# Patient Record
Sex: MALE | Race: WHITE | NOT HISPANIC OR LATINO | Employment: FULL TIME | URBAN - METROPOLITAN AREA
[De-identification: names, ages, dates, MRNs, and addresses within clinical notes are randomized per-mention and may not be internally consistent; named-entity substitution may affect disease eponyms.]

---

## 2017-09-01 ENCOUNTER — OFFICE VISIT (OUTPATIENT)
Dept: URGENT CARE | Facility: MEDICAL CENTER | Age: 27
End: 2017-09-01
Payer: COMMERCIAL

## 2017-09-01 PROCEDURE — 93005 ELECTROCARDIOGRAM TRACING: CPT

## 2017-09-02 ENCOUNTER — GENERIC CONVERSION - ENCOUNTER (OUTPATIENT)
Dept: OTHER | Facility: OTHER | Age: 27
End: 2017-09-02

## 2017-09-02 LAB
ATRIAL RATE: 64 BPM
ATRIAL RATE: 65 BPM
P AXIS: 46 DEGREES
P AXIS: 59 DEGREES
PR INTERVAL: 138 MS
PR INTERVAL: 138 MS
QRS AXIS: 63 DEGREES
QRS AXIS: 65 DEGREES
QRSD INTERVAL: 88 MS
QRSD INTERVAL: 88 MS
QT INTERVAL: 396 MS
QT INTERVAL: 396 MS
QTC INTERVAL: 408 MS
QTC INTERVAL: 411 MS
T WAVE AXIS: 49 DEGREES
T WAVE AXIS: 51 DEGREES
VENTRICULAR RATE: 64 BPM
VENTRICULAR RATE: 65 BPM

## 2017-09-06 ENCOUNTER — TRANSCRIBE ORDERS (OUTPATIENT)
Dept: ADMINISTRATIVE | Facility: HOSPITAL | Age: 27
End: 2017-09-06

## 2017-09-06 ENCOUNTER — APPOINTMENT (OUTPATIENT)
Dept: LAB | Facility: MEDICAL CENTER | Age: 27
End: 2017-09-06
Payer: COMMERCIAL

## 2017-09-06 DIAGNOSIS — E11.9 DIABETES MELLITUS WITHOUT COMPLICATION (HCC): Primary | ICD-10-CM

## 2017-09-06 LAB
ALBUMIN SERPL BCP-MCNC: 3.7 G/DL (ref 3.5–5)
ALP SERPL-CCNC: 69 U/L (ref 46–116)
ALT SERPL W P-5'-P-CCNC: 43 U/L (ref 12–78)
ANION GAP SERPL CALCULATED.3IONS-SCNC: 5 MMOL/L (ref 4–13)
AST SERPL W P-5'-P-CCNC: 15 U/L (ref 5–45)
BASOPHILS # BLD AUTO: 0.08 THOUSANDS/ΜL (ref 0–0.1)
BASOPHILS NFR BLD AUTO: 1 % (ref 0–1)
BILIRUB SERPL-MCNC: 1.26 MG/DL (ref 0.2–1)
BUN SERPL-MCNC: 8 MG/DL (ref 5–25)
CALCIUM SERPL-MCNC: 9.4 MG/DL (ref 8.3–10.1)
CHLORIDE SERPL-SCNC: 100 MMOL/L (ref 100–108)
CHOLEST SERPL-MCNC: 184 MG/DL (ref 50–200)
CO2 SERPL-SCNC: 30 MMOL/L (ref 21–32)
CREAT SERPL-MCNC: 0.96 MG/DL (ref 0.6–1.3)
CREAT UR-MCNC: 109 MG/DL
EOSINOPHIL # BLD AUTO: 0.3 THOUSAND/ΜL (ref 0–0.61)
EOSINOPHIL NFR BLD AUTO: 4 % (ref 0–6)
ERYTHROCYTE [DISTWIDTH] IN BLOOD BY AUTOMATED COUNT: 12.3 % (ref 11.6–15.1)
ERYTHROCYTE [SEDIMENTATION RATE] IN BLOOD: 5 MM/HOUR (ref 0–10)
EST. AVERAGE GLUCOSE BLD GHB EST-MCNC: 183 MG/DL
GFR SERPL CREATININE-BSD FRML MDRD: 108 ML/MIN/1.73SQ M
GLUCOSE P FAST SERPL-MCNC: 283 MG/DL (ref 65–99)
HBA1C MFR BLD: 8 % (ref 4.2–6.3)
HCT VFR BLD AUTO: 47.5 % (ref 36.5–49.3)
HDLC SERPL-MCNC: 84 MG/DL (ref 40–60)
HGB BLD-MCNC: 16.5 G/DL (ref 12–17)
LDLC SERPL CALC-MCNC: 90 MG/DL (ref 0–100)
LYMPHOCYTES # BLD AUTO: 2.48 THOUSANDS/ΜL (ref 0.6–4.47)
LYMPHOCYTES NFR BLD AUTO: 31 % (ref 14–44)
MCH RBC QN AUTO: 34.1 PG (ref 26.8–34.3)
MCHC RBC AUTO-ENTMCNC: 34.7 G/DL (ref 31.4–37.4)
MCV RBC AUTO: 98 FL (ref 82–98)
MICROALBUMIN UR-MCNC: 7.8 MG/L (ref 0–20)
MICROALBUMIN/CREAT 24H UR: 7 MG/G CREATININE (ref 0–30)
MONOCYTES # BLD AUTO: 0.69 THOUSAND/ΜL (ref 0.17–1.22)
MONOCYTES NFR BLD AUTO: 9 % (ref 4–12)
NEUTROPHILS # BLD AUTO: 4.44 THOUSANDS/ΜL (ref 1.85–7.62)
NEUTS SEG NFR BLD AUTO: 55 % (ref 43–75)
NRBC BLD AUTO-RTO: 0 /100 WBCS
PLATELET # BLD AUTO: 290 THOUSANDS/UL (ref 149–390)
PMV BLD AUTO: 9.8 FL (ref 8.9–12.7)
POTASSIUM SERPL-SCNC: 4.8 MMOL/L (ref 3.5–5.3)
PROT SERPL-MCNC: 7.4 G/DL (ref 6.4–8.2)
RBC # BLD AUTO: 4.84 MILLION/UL (ref 3.88–5.62)
SODIUM SERPL-SCNC: 135 MMOL/L (ref 136–145)
TRIGL SERPL-MCNC: 51 MG/DL
WBC # BLD AUTO: 8.01 THOUSAND/UL (ref 4.31–10.16)

## 2017-09-06 PROCEDURE — 85025 COMPLETE CBC W/AUTO DIFF WBC: CPT | Performed by: INTERNAL MEDICINE

## 2017-09-06 PROCEDURE — 82043 UR ALBUMIN QUANTITATIVE: CPT | Performed by: INTERNAL MEDICINE

## 2017-09-06 PROCEDURE — 36415 COLL VENOUS BLD VENIPUNCTURE: CPT | Performed by: INTERNAL MEDICINE

## 2017-09-06 PROCEDURE — 83036 HEMOGLOBIN GLYCOSYLATED A1C: CPT | Performed by: INTERNAL MEDICINE

## 2017-09-06 PROCEDURE — 80053 COMPREHEN METABOLIC PANEL: CPT | Performed by: INTERNAL MEDICINE

## 2017-09-06 PROCEDURE — 82570 ASSAY OF URINE CREATININE: CPT | Performed by: INTERNAL MEDICINE

## 2017-09-06 PROCEDURE — 80061 LIPID PANEL: CPT | Performed by: INTERNAL MEDICINE

## 2017-09-06 PROCEDURE — 85652 RBC SED RATE AUTOMATED: CPT | Performed by: INTERNAL MEDICINE

## 2018-08-29 ENCOUNTER — TRANSCRIBE ORDERS (OUTPATIENT)
Dept: ADMINISTRATIVE | Facility: HOSPITAL | Age: 28
End: 2018-08-29

## 2018-08-29 ENCOUNTER — APPOINTMENT (OUTPATIENT)
Dept: LAB | Facility: MEDICAL CENTER | Age: 28
End: 2018-08-29
Payer: COMMERCIAL

## 2018-08-29 DIAGNOSIS — E11.9 DIABETES MELLITUS WITHOUT COMPLICATION (HCC): Primary | ICD-10-CM

## 2018-08-29 LAB
CREAT UR-MCNC: 149 MG/DL
EST. AVERAGE GLUCOSE BLD GHB EST-MCNC: 189 MG/DL
HBA1C MFR BLD: 8.2 % (ref 4.2–6.3)
MICROALBUMIN UR-MCNC: 11.2 MG/L (ref 0–20)
MICROALBUMIN/CREAT 24H UR: 8 MG/G CREATININE (ref 0–30)

## 2018-08-29 PROCEDURE — 82043 UR ALBUMIN QUANTITATIVE: CPT | Performed by: INTERNAL MEDICINE

## 2018-08-29 PROCEDURE — 82570 ASSAY OF URINE CREATININE: CPT | Performed by: INTERNAL MEDICINE

## 2018-08-29 PROCEDURE — 36415 COLL VENOUS BLD VENIPUNCTURE: CPT | Performed by: INTERNAL MEDICINE

## 2018-08-29 PROCEDURE — 83036 HEMOGLOBIN GLYCOSYLATED A1C: CPT | Performed by: INTERNAL MEDICINE

## 2019-03-28 ENCOUNTER — HOSPITAL ENCOUNTER (EMERGENCY)
Facility: HOSPITAL | Age: 29
Discharge: HOME | End: 2019-03-28
Attending: EMERGENCY MEDICINE
Payer: COMMERCIAL

## 2019-03-28 VITALS
BODY MASS INDEX: 25.02 KG/M2 | RESPIRATION RATE: 16 BRPM | SYSTOLIC BLOOD PRESSURE: 126 MMHG | TEMPERATURE: 98 F | OXYGEN SATURATION: 100 % | WEIGHT: 155 LBS | HEART RATE: 94 BPM | DIASTOLIC BLOOD PRESSURE: 82 MMHG

## 2019-03-28 DIAGNOSIS — E10.649 HYPOGLYCEMIA DUE TO TYPE 1 DIABETES MELLITUS (HCC): Primary | ICD-10-CM

## 2019-03-28 LAB
ANION GAP SERPL CALCULATED.3IONS-SCNC: 11 MMOL/L (ref 4–13)
BASOPHILS # BLD AUTO: 0.06 THOUSANDS/ΜL (ref 0–0.1)
BASOPHILS NFR BLD AUTO: 1 % (ref 0–1)
BUN SERPL-MCNC: 9 MG/DL (ref 5–25)
CALCIUM SERPL-MCNC: 9.4 MG/DL (ref 8.3–10.1)
CHLORIDE SERPL-SCNC: 102 MMOL/L (ref 100–108)
CO2 SERPL-SCNC: 29 MMOL/L (ref 21–32)
CREAT SERPL-MCNC: 0.79 MG/DL (ref 0.6–1.3)
EOSINOPHIL # BLD AUTO: 0.08 THOUSAND/ΜL (ref 0–0.61)
EOSINOPHIL NFR BLD AUTO: 1 % (ref 0–6)
ERYTHROCYTE [DISTWIDTH] IN BLOOD BY AUTOMATED COUNT: 13.2 % (ref 11.6–15.1)
GFR SERPL CREATININE-BSD FRML MDRD: 121 ML/MIN/1.73SQ M
GLUCOSE SERPL-MCNC: 135 MG/DL (ref 65–140)
GLUCOSE SERPL-MCNC: 193 MG/DL (ref 65–140)
GLUCOSE SERPL-MCNC: 30 MG/DL (ref 65–140)
GLUCOSE SERPL-MCNC: <20 MG/DL (ref 65–140)
HCT VFR BLD AUTO: 45.1 % (ref 36.5–49.3)
HGB BLD-MCNC: 15.3 G/DL (ref 12–17)
IMM GRANULOCYTES # BLD AUTO: 0.05 THOUSAND/UL (ref 0–0.2)
IMM GRANULOCYTES NFR BLD AUTO: 0 % (ref 0–2)
LYMPHOCYTES # BLD AUTO: 1.86 THOUSANDS/ΜL (ref 0.6–4.47)
LYMPHOCYTES NFR BLD AUTO: 17 % (ref 14–44)
MCH RBC QN AUTO: 32.7 PG (ref 26.8–34.3)
MCHC RBC AUTO-ENTMCNC: 33.9 G/DL (ref 31.4–37.4)
MCV RBC AUTO: 96 FL (ref 82–98)
MONOCYTES # BLD AUTO: 0.73 THOUSAND/ΜL (ref 0.17–1.22)
MONOCYTES NFR BLD AUTO: 7 % (ref 4–12)
NEUTROPHILS # BLD AUTO: 8.35 THOUSANDS/ΜL (ref 1.85–7.62)
NEUTS SEG NFR BLD AUTO: 74 % (ref 43–75)
NRBC BLD AUTO-RTO: 0 /100 WBCS
PLATELET # BLD AUTO: 293 THOUSANDS/UL (ref 149–390)
PMV BLD AUTO: 8.5 FL (ref 8.9–12.7)
POTASSIUM SERPL-SCNC: 3.5 MMOL/L (ref 3.5–5.3)
RBC # BLD AUTO: 4.68 MILLION/UL (ref 3.88–5.62)
SODIUM SERPL-SCNC: 142 MMOL/L (ref 136–145)
WBC # BLD AUTO: 11.13 THOUSAND/UL (ref 4.31–10.16)

## 2019-03-28 PROCEDURE — 85025 COMPLETE CBC W/AUTO DIFF WBC: CPT | Performed by: EMERGENCY MEDICINE

## 2019-03-28 PROCEDURE — 99285 EMERGENCY DEPT VISIT HI MDM: CPT

## 2019-03-28 PROCEDURE — 80048 BASIC METABOLIC PNL TOTAL CA: CPT | Performed by: EMERGENCY MEDICINE

## 2019-03-28 PROCEDURE — 36415 COLL VENOUS BLD VENIPUNCTURE: CPT | Performed by: EMERGENCY MEDICINE

## 2019-03-28 PROCEDURE — 82948 REAGENT STRIP/BLOOD GLUCOSE: CPT

## 2019-03-28 PROCEDURE — 96374 THER/PROPH/DIAG INJ IV PUSH: CPT

## 2019-03-28 RX ORDER — DEXTROSE MONOHYDRATE 25 G/50ML
INJECTION, SOLUTION INTRAVENOUS
Status: DISCONTINUED
Start: 2019-03-28 | End: 2019-03-28 | Stop reason: HOSPADM

## 2019-03-28 RX ORDER — DEXTROSE MONOHYDRATE 25 G/50ML
50 INJECTION, SOLUTION INTRAVENOUS ONCE
Status: COMPLETED | OUTPATIENT
Start: 2019-03-28 | End: 2019-03-28

## 2019-03-28 RX ADMIN — DEXTROSE MONOHYDRATE 50 ML: 500 INJECTION PARENTERAL at 10:15

## 2019-12-17 ENCOUNTER — APPOINTMENT (EMERGENCY)
Dept: RADIOLOGY | Facility: HOSPITAL | Age: 29
DRG: 639 | End: 2019-12-17
Payer: COMMERCIAL

## 2019-12-17 ENCOUNTER — HOSPITAL ENCOUNTER (INPATIENT)
Facility: HOSPITAL | Age: 29
LOS: 1 days | Discharge: HOME | DRG: 639 | End: 2019-12-18
Attending: EMERGENCY MEDICINE | Admitting: ANESTHESIOLOGY
Payer: COMMERCIAL

## 2019-12-17 DIAGNOSIS — R07.9 CHEST PAIN: ICD-10-CM

## 2019-12-17 DIAGNOSIS — E11.10 DKA (DIABETIC KETOACIDOSES): Primary | ICD-10-CM

## 2019-12-17 PROBLEM — E10.10 TYPE 1 DIABETES MELLITUS WITH KETOACIDOSIS WITHOUT COMA (HCC): Status: ACTIVE | Noted: 2019-12-17

## 2019-12-17 LAB
ALBUMIN SERPL BCP-MCNC: 4 G/DL (ref 3.5–5)
ALP SERPL-CCNC: 69 U/L (ref 46–116)
ALT SERPL W P-5'-P-CCNC: 24 U/L (ref 12–78)
AMPHETAMINES SERPL QL SCN: NEGATIVE
ANION GAP SERPL CALCULATED.3IONS-SCNC: 13 MMOL/L (ref 4–13)
ANION GAP SERPL CALCULATED.3IONS-SCNC: 17 MMOL/L (ref 4–13)
ANION GAP SERPL CALCULATED.3IONS-SCNC: 7 MMOL/L (ref 4–13)
AST SERPL W P-5'-P-CCNC: 23 U/L (ref 5–45)
BACTERIA UR QL AUTO: NORMAL /HPF
BARBITURATES UR QL: NEGATIVE
BASE EX.OXY STD BLDV CALC-SCNC: 88.4 % (ref 60–80)
BASE EXCESS BLDV CALC-SCNC: -11.1 MMOL/L
BASOPHILS # BLD AUTO: 0.13 THOUSANDS/ΜL (ref 0–0.1)
BASOPHILS NFR BLD AUTO: 1 % (ref 0–1)
BENZODIAZ UR QL: NEGATIVE
BETA-HYDROXYBUTYRATE: 5.3 MMOL/L
BILIRUB SERPL-MCNC: 1.3 MG/DL (ref 0.2–1)
BILIRUB UR QL STRIP: NEGATIVE
BUN SERPL-MCNC: 16 MG/DL (ref 5–25)
CALCIUM SERPL-MCNC: 7.8 MG/DL (ref 8.3–10.1)
CALCIUM SERPL-MCNC: 8 MG/DL (ref 8.3–10.1)
CALCIUM SERPL-MCNC: 9.1 MG/DL (ref 8.3–10.1)
CHLORIDE SERPL-SCNC: 102 MMOL/L (ref 100–108)
CHLORIDE SERPL-SCNC: 103 MMOL/L (ref 100–108)
CHLORIDE SERPL-SCNC: 97 MMOL/L (ref 100–108)
CLARITY UR: CLEAR
CO2 SERPL-SCNC: 17 MMOL/L (ref 21–32)
CO2 SERPL-SCNC: 18 MMOL/L (ref 21–32)
CO2 SERPL-SCNC: 22 MMOL/L (ref 21–32)
COCAINE UR QL: NEGATIVE
COLOR UR: YELLOW
CREAT SERPL-MCNC: 0.95 MG/DL (ref 0.6–1.3)
CREAT SERPL-MCNC: 1.03 MG/DL (ref 0.6–1.3)
CREAT SERPL-MCNC: 1.04 MG/DL (ref 0.6–1.3)
D DIMER PPP FEU-MCNC: 0.19 UG/ML FEU (ref 0.19–0.52)
EOSINOPHIL # BLD AUTO: 0.02 THOUSAND/ΜL (ref 0–0.61)
EOSINOPHIL NFR BLD AUTO: 0 % (ref 0–6)
ERYTHROCYTE [DISTWIDTH] IN BLOOD BY AUTOMATED COUNT: 12.4 % (ref 11.6–15.1)
GFR SERPL CREATININE-BSD FRML MDRD: 108 ML/MIN/1.73SQ M
GFR SERPL CREATININE-BSD FRML MDRD: 97 ML/MIN/1.73SQ M
GFR SERPL CREATININE-BSD FRML MDRD: 98 ML/MIN/1.73SQ M
GLUCOSE SERPL-MCNC: 167 MG/DL (ref 65–140)
GLUCOSE SERPL-MCNC: 171 MG/DL (ref 65–140)
GLUCOSE SERPL-MCNC: 200 MG/DL (ref 65–140)
GLUCOSE SERPL-MCNC: 207 MG/DL (ref 65–140)
GLUCOSE SERPL-MCNC: 289 MG/DL (ref 65–140)
GLUCOSE SERPL-MCNC: 318 MG/DL (ref 65–140)
GLUCOSE SERPL-MCNC: 319 MG/DL (ref 65–140)
GLUCOSE SERPL-MCNC: 385 MG/DL (ref 65–140)
GLUCOSE UR STRIP-MCNC: ABNORMAL MG/DL
HCO3 BLDV-SCNC: 14.3 MMOL/L (ref 24–30)
HCT VFR BLD AUTO: 48 % (ref 36.5–49.3)
HGB BLD-MCNC: 15.6 G/DL (ref 12–17)
HGB UR QL STRIP.AUTO: NEGATIVE
IMM GRANULOCYTES # BLD AUTO: 0.12 THOUSAND/UL (ref 0–0.2)
IMM GRANULOCYTES NFR BLD AUTO: 1 % (ref 0–2)
KETONES UR STRIP-MCNC: ABNORMAL MG/DL
LACTATE SERPL-SCNC: 1.7 MMOL/L (ref 0.5–2)
LACTATE SERPL-SCNC: 2.7 MMOL/L (ref 0.5–2)
LEUKOCYTE ESTERASE UR QL STRIP: ABNORMAL
LYMPHOCYTES # BLD AUTO: 1.1 THOUSANDS/ΜL (ref 0.6–4.47)
LYMPHOCYTES NFR BLD AUTO: 6 % (ref 14–44)
MAGNESIUM SERPL-MCNC: 1.7 MG/DL (ref 1.6–2.6)
MCH RBC QN AUTO: 33.1 PG (ref 26.8–34.3)
MCHC RBC AUTO-ENTMCNC: 32.5 G/DL (ref 31.4–37.4)
MCV RBC AUTO: 102 FL (ref 82–98)
METHADONE UR QL: NEGATIVE
MONOCYTES # BLD AUTO: 0.93 THOUSAND/ΜL (ref 0.17–1.22)
MONOCYTES NFR BLD AUTO: 5 % (ref 4–12)
NEUTROPHILS # BLD AUTO: 16.87 THOUSANDS/ΜL (ref 1.85–7.62)
NEUTS SEG NFR BLD AUTO: 87 % (ref 43–75)
NITRITE UR QL STRIP: NEGATIVE
NON-SQ EPI CELLS URNS QL MICRO: NORMAL /HPF
NRBC BLD AUTO-RTO: 0 /100 WBCS
O2 CT BLDV-SCNC: 19.6 ML/DL
OPIATES UR QL SCN: NEGATIVE
PCO2 BLDV: 31.3 MM HG (ref 42–50)
PCP UR QL: NEGATIVE
PH BLDV: 7.28 [PH] (ref 7.3–7.4)
PH UR STRIP.AUTO: 5.5 [PH]
PHOSPHATE SERPL-MCNC: 2.7 MG/DL (ref 2.7–4.5)
PLATELET # BLD AUTO: 329 THOUSANDS/UL (ref 149–390)
PMV BLD AUTO: 9.1 FL (ref 8.9–12.7)
PO2 BLDV: 63.7 MM HG (ref 35–45)
POTASSIUM SERPL-SCNC: 3.9 MMOL/L (ref 3.5–5.3)
POTASSIUM SERPL-SCNC: 4.5 MMOL/L (ref 3.5–5.3)
POTASSIUM SERPL-SCNC: 5.1 MMOL/L (ref 3.5–5.3)
PROT SERPL-MCNC: 7.1 G/DL (ref 6.4–8.2)
PROT UR STRIP-MCNC: NEGATIVE MG/DL
RBC # BLD AUTO: 4.71 MILLION/UL (ref 3.88–5.62)
RBC #/AREA URNS AUTO: NORMAL /HPF
SODIUM SERPL-SCNC: 131 MMOL/L (ref 136–145)
SODIUM SERPL-SCNC: 132 MMOL/L (ref 136–145)
SODIUM SERPL-SCNC: 133 MMOL/L (ref 136–145)
SP GR UR STRIP.AUTO: 1.02 (ref 1–1.03)
THC UR QL: NEGATIVE
TROPONIN I SERPL-MCNC: <0.02 NG/ML
UROBILINOGEN UR QL STRIP.AUTO: 0.2 E.U./DL
WBC # BLD AUTO: 19.17 THOUSAND/UL (ref 4.31–10.16)
WBC #/AREA URNS AUTO: NORMAL /HPF

## 2019-12-17 PROCEDURE — 83735 ASSAY OF MAGNESIUM: CPT | Performed by: EMERGENCY MEDICINE

## 2019-12-17 PROCEDURE — 85025 COMPLETE CBC W/AUTO DIFF WBC: CPT | Performed by: EMERGENCY MEDICINE

## 2019-12-17 PROCEDURE — 93005 ELECTROCARDIOGRAM TRACING: CPT

## 2019-12-17 PROCEDURE — 85379 FIBRIN DEGRADATION QUANT: CPT | Performed by: EMERGENCY MEDICINE

## 2019-12-17 PROCEDURE — 81001 URINALYSIS AUTO W/SCOPE: CPT | Performed by: EMERGENCY MEDICINE

## 2019-12-17 PROCEDURE — 80048 BASIC METABOLIC PNL TOTAL CA: CPT | Performed by: NURSE PRACTITIONER

## 2019-12-17 PROCEDURE — 36415 COLL VENOUS BLD VENIPUNCTURE: CPT | Performed by: EMERGENCY MEDICINE

## 2019-12-17 PROCEDURE — 80307 DRUG TEST PRSMV CHEM ANLYZR: CPT | Performed by: EMERGENCY MEDICINE

## 2019-12-17 PROCEDURE — 82948 REAGENT STRIP/BLOOD GLUCOSE: CPT

## 2019-12-17 PROCEDURE — 87081 CULTURE SCREEN ONLY: CPT | Performed by: NURSE PRACTITIONER

## 2019-12-17 PROCEDURE — 71046 X-RAY EXAM CHEST 2 VIEWS: CPT

## 2019-12-17 PROCEDURE — 82010 KETONE BODYS QUAN: CPT | Performed by: EMERGENCY MEDICINE

## 2019-12-17 PROCEDURE — 83605 ASSAY OF LACTIC ACID: CPT | Performed by: EMERGENCY MEDICINE

## 2019-12-17 PROCEDURE — 84100 ASSAY OF PHOSPHORUS: CPT | Performed by: EMERGENCY MEDICINE

## 2019-12-17 PROCEDURE — 99285 EMERGENCY DEPT VISIT HI MDM: CPT

## 2019-12-17 PROCEDURE — 80053 COMPREHEN METABOLIC PANEL: CPT | Performed by: EMERGENCY MEDICINE

## 2019-12-17 PROCEDURE — 82805 BLOOD GASES W/O2 SATURATION: CPT | Performed by: EMERGENCY MEDICINE

## 2019-12-17 PROCEDURE — 84484 ASSAY OF TROPONIN QUANT: CPT | Performed by: EMERGENCY MEDICINE

## 2019-12-17 RX ORDER — ASPIRIN 325 MG
325 TABLET ORAL ONCE
Status: DISCONTINUED | OUTPATIENT
Start: 2019-12-17 | End: 2019-12-17

## 2019-12-17 RX ORDER — ACETAMINOPHEN 325 MG/1
650 TABLET ORAL ONCE
Status: COMPLETED | OUTPATIENT
Start: 2019-12-17 | End: 2019-12-17

## 2019-12-17 RX ORDER — SODIUM CHLORIDE, SODIUM LACTATE, POTASSIUM CHLORIDE, CALCIUM CHLORIDE 600; 310; 30; 20 MG/100ML; MG/100ML; MG/100ML; MG/100ML
125 INJECTION, SOLUTION INTRAVENOUS CONTINUOUS
Status: DISCONTINUED | OUTPATIENT
Start: 2019-12-17 | End: 2019-12-18

## 2019-12-17 RX ADMIN — SODIUM CHLORIDE 1000 ML: 0.9 INJECTION, SOLUTION INTRAVENOUS at 14:29

## 2019-12-17 RX ADMIN — ACETAMINOPHEN 650 MG: 325 TABLET, FILM COATED ORAL at 14:56

## 2019-12-17 RX ADMIN — SODIUM CHLORIDE 1000 ML: 0.9 INJECTION, SOLUTION INTRAVENOUS at 14:55

## 2019-12-17 RX ADMIN — SODIUM CHLORIDE, SODIUM LACTATE, POTASSIUM CHLORIDE, AND CALCIUM CHLORIDE 125 ML/HR: .6; .31; .03; .02 INJECTION, SOLUTION INTRAVENOUS at 17:17

## 2019-12-17 RX ADMIN — SODIUM CHLORIDE 6 UNITS/HR: 9 INJECTION, SOLUTION INTRAVENOUS at 15:19

## 2019-12-17 RX ADMIN — INSULIN HUMAN 10 UNITS: 100 INJECTION, SOLUTION PARENTERAL at 14:58

## 2019-12-17 NOTE — H&P
H&P- Veena Wong 1990, 34 y o  male MRN: [id number]    Unit/Bed#: ED 01 Encounter: [id number]    Primary Care Provider: Judah Reid MD   Date and time admitted to hospital: 12/17/2019  1:25 PM        * Type 1 diabetes mellitus with ketoacidosis without coma [facility])  Assessment & Plan  Lab Results   Component Value Date    HGBA1C 8 2 (H) 08/29/2018       Recent Labs     12/17/19  1515   POCGLU 318*       Blood Sugar Average: Last 72 hrs:  (P) 318     · First occurrence for DKA, states he is otherwise well controlled w/ insulin pump  · Will transition to insulin drip, once gap closes can transition back to pump  · Diabetic diet  · LR at 125 cc/hr until repeat BMP  · BMP Q4H until anion gap closed  · Probable d/c home tomorrow      -------------------------------------------------------------------------------------------------------------  Chief Complaint: Not feeling well, chest pain that radiated to his jaw and left arm    History of Present Illness   Veena Wong is a 34 y o  male with pmhx of type 1 DM who presents with chest pain that radiated to his left arm and jaw and generalized malaise and felt his BS was elevated  After work-up in the ED he was found to be in DKA  BS was only 385 but anion gap was 17, pH 7 27  Currently w/o CP or SOB, denies any other complaints and states he is normally well controlled on his insulin pump  Admit to SD for BS control  History obtained from chart review and the patient   -------------------------------------------------------------------------------------------------------------  Dispo: Transfer to Stepdown Level 1     Code Status: No Order  --------------------------------------------------------------------------------------------------------------  Review of Systems   Constitutional: Positive for fatigue  HENT: Negative  Eyes: Negative  Respiratory: Negative  Cardiovascular: Negative  Gastrointestinal: Negative  Genitourinary: Negative  Musculoskeletal: Negative  Skin: Negative  Neurological: Negative  Psychiatric/Behavioral: Negative  All other systems reviewed and are negative  Physical Exam   Constitutional: He is oriented to person, place, and time  He appears well-developed and well-nourished  No distress  HENT:   Head: Normocephalic and atraumatic  Eyes: Pupils are equal, round, and reactive to light  EOM are normal    Neck: Normal range of motion  Cardiovascular: Normal rate and regular rhythm  Pulmonary/Chest: Effort normal and breath sounds normal  No respiratory distress  Abdominal: Soft  Bowel sounds are normal  He exhibits no distension  There is no tenderness  Musculoskeletal: Normal range of motion  He exhibits no edema  Neurological: He is alert and oriented to person, place, and time  Skin: Skin is warm and dry  Capillary refill takes less than 2 seconds  He is not diaphoretic  Psychiatric: He has a normal mood and affect  His behavior is normal    Nursing note and vitals reviewed  --------------------------------------------------------------------------------------------------------------  Historical Information   Past Medical History:   Diagnosis Date    Diabetes mellitus ([facility] [address] )      History reviewed  No pertinent surgical history  Social History   Social History     Substance and Sexual Activity   Alcohol Use Yes    Comment: 2-3 beers daily     Social History     Substance and Sexual Activity   Drug Use Never     Social History     Tobacco Use   Smoking Status Current Every Day Smoker    Packs/day: 0 50    Types: Cigarettes   Smokeless Tobacco Never Used     Exercise History: ambulatory  Family History:   History reviewed  No pertinent family history    I have reviewed this patient's family history and commented on sigificant items within the HPI    Vitals:   Vitals:    12/17/19 1438 12/17/19 1445 12/17/19 1500 12/17/19 1515   BP: 134/60  126/56 103/55   BP Location: Right arm   Right arm   Pulse: (!) 122 (!) 122 (!) 118 (!) 114   Resp: 18 (!) 25 20 20   Temp:    98 5 °F (36 9 °C)   TempSrc:    Tympanic   SpO2: 97% 99% 100% 100%   Weight: 72 6 kg (160 lb)      Height: 5' 6" (1 676 m)        Temp  Min: 98 3 °F (36 8 °C)  Max: 98 5 °F (36 9 °C)  IBW: 63 8 kg  Height: 5' 6" (167 6 cm)  Body mass index is 25 82 kg/m²  Medications:  Current Facility-Administered Medications   Medication Dose Route Frequency    insulin regular (HumuLIN R,NovoLIN R) 1 Units/mL in sodium chloride 0 9 % 100 mL infusion  0 3-21 Units/hr Intravenous Titrated     Home medications:  Prior to Admission Medications   Prescriptions Last Dose Informant Patient Reported?  Taking?   insulin lispro (HUMALOG) 100 units/mL injection   Yes Yes   Sig: Inject under the skin      Facility-Administered Medications: None     Allergies:  No Active Allergies      Laboratory and Diagnostics:  Results from last 7 days   Lab Units 12/17/19  1333   WBC Thousand/uL 19 17*   HEMOGLOBIN g/dL 15 6   HEMATOCRIT % 48 0   PLATELETS Thousands/uL 329   NEUTROS PCT % 87*   MONOS PCT % 5     Results from last 7 days   Lab Units 12/17/19  1333   SODIUM mmol/L 132*   POTASSIUM mmol/L 5 1   CHLORIDE mmol/L 97*   CO2 mmol/L 18*   ANION GAP mmol/L 17*   BUN mg/dL 16   CREATININE mg/dL 1 03   CALCIUM mg/dL 9 1   GLUCOSE RANDOM mg/dL 385*   ALT U/L 24   AST U/L 23   ALK PHOS U/L 69   ALBUMIN g/dL 4 0   TOTAL BILIRUBIN mg/dL 1 30*     Results from last 7 days   Lab Units 12/17/19  1333   MAGNESIUM mg/dL 1 7   PHOSPHORUS mg/dL 2 7           Results from last 7 days   Lab Units 12/17/19  1333   TROPONIN I ng/mL <0 02     Results from last 7 days   Lab Units 12/17/19  1518 12/17/19  1333   LACTIC ACID mmol/L 1 7 2 7*     ABG:    VBG:  Results from last 7 days   Lab Units 12/17/19  1429   PH MANSI  7 277*   PCO2 MANSI mm Hg 31 3*   PO2 MANSI mm Hg 63 7*   HCO3 MANSI mmol/L 14 3*   BASE EXC MANSI mmol/L -11 1           Micro:          EKG: NSR  Imaging: I have personally reviewed pertinent reports        ------------------------------------------------------------------------------------------------------------  Advance Directive and Living Will:      Power of :    POLST:    ------------------------------------------------------------------------------------------------------------  Anticipated Length of Stay is > 2 midnights      [address]

## 2019-12-17 NOTE — ED NOTES
Per Dr Belem Hebert, NO sepsis alert----pt in DKA, has no source        Cary Easley RN  12/17/19 7031

## 2019-12-17 NOTE — PLAN OF CARE
Problem: PAIN - ADULT  Goal: Verbalizes/displays adequate comfort level or baseline comfort level  Description  Interventions:  - Encourage patient to monitor pain and request assistance  - Assess pain using appropriate pain scale  - Administer analgesics based on type and severity of pain and evaluate response  - Implement non-pharmacological measures as appropriate and evaluate response  - Consider cultural and social influences on pain and pain management  - Notify physician/advanced practitioner if interventions unsuccessful or patient reports new pain  Outcome: Progressing     Problem: SAFETY ADULT  Goal: Patient will remain free of falls  Description  INTERVENTIONS:  - Assess patient frequently for physical needs  -  Identify cognitive and physical deficits and behaviors that affect risk of falls    -  [place] fall precautions as indicated by assessment   - Educate patient/family on patient safety including physical limitations  - Instruct patient to call for assistance with activity based on assessment  - Modify environment to reduce risk of injury  - Consider OT/PT consult to assist with strengthening/mobility  Outcome: Progressing     Problem: DISCHARGE PLANNING  Goal: Discharge to home or other facility with appropriate resources  Description  INTERVENTIONS:  - Identify barriers to discharge w/patient and caregiver  - Arrange for needed discharge resources and transportation as appropriate  - Identify discharge learning needs (meds, wound care, etc )  - Arrange for interpretive services to assist at discharge as needed  - Refer to Case Management Department for coordinating discharge planning if the patient needs post-hospital services based on physician/advanced practitioner order or complex needs related to functional status, cognitive ability, or social support system  Outcome: Progressing     Problem: Knowledge Deficit  Goal: Patient/family/caregiver demonstrates understanding of disease process, treatment plan, medications, and discharge instructions  Description  Complete learning assessment and assess knowledge base    Interventions:  - Provide teaching at level of understanding  - Provide teaching via preferred learning methods  Outcome: Progressing     Problem: METABOLIC, FLUID AND ELECTROLYTES - ADULT  Goal: Glucose maintained within target range  Description  INTERVENTIONS:  - Monitor Blood Glucose as ordered  - Assess for signs and symptoms of hyperglycemia and hypoglycemia  - Administer ordered medications to maintain glucose within target range  - Assess nutritional intake and initiate nutrition service referral as needed  Outcome: Progressing

## 2019-12-17 NOTE — ED NOTES
Pt states that CP is better, 1/10 reported  Pt co of headache 5/10  Denies any nauseous and other GI symptoms  Pt awake and alert  Dr Christy Umaña made aware       Brennan Clarke RN  12/17/19 5184 [facility] Jaja Arroyo RN  12/17/19 3735

## 2019-12-17 NOTE — ED PROVIDER NOTES
History  Chief Complaint   Patient presents with    Chest Pain     patient c/o 45-50 minute onset of substernal chest pain (was riding in a car when pain started)  states the pain radiated down his left arm and into his jaw   + nausea  vomited x 1   states pain improved after vomiting  63-year-old male past medical history of type 1 insulin-dependent diabetes, presents to the ER with complaint of overall not feeling well since this morning  Patient was at work and felt like his blood glucose was rising  Patient ran out of test strips at his work  The patient gave himself a bolus of insulin however it did not improve his symptoms  Patient then started to have left-sided chest pressure that started to radiate to his jaw and left arm  Patient became very concerned that he was having a heart attack and called 911 as a result  Upon EMS arrival, patient had fingerstick glucose of 272  Patient was also given 324 of aspirin, 500 mL of normal saline, and 4 mg of IV Zofran given on route to the ED  Patient is starting to feel better in the ED however continues to have chest pressure  Patient denies any previous history of having DKA  History provided by:  Patient      Prior to Admission Medications   Prescriptions Last Dose Informant Patient Reported? Taking?   insulin lispro (HUMALOG) 100 units/mL injection 12/17/2019 at Unknown time  Yes Yes   Sig: Inject under the skin      Facility-Administered Medications: None       Past Medical History:   Diagnosis Date    Diabetes mellitus ([facility] [address] )        History reviewed  No pertinent surgical history  History reviewed  No pertinent family history  I have reviewed and agree with the history as documented      Social History     Tobacco Use    Smoking status: Current Every Day Smoker     Packs/day: 0 50     Types: Cigarettes    Smokeless tobacco: Current User   Substance Use Topics    Alcohol use: Yes     Comment: 2-3 beers daily    Drug use: Never Review of Systems   Constitutional: Negative for activity change, fatigue and fever  HENT: Negative for congestion, ear discharge and sore throat  Eyes: Negative for pain and redness  Respiratory: Positive for chest tightness  Negative for cough, shortness of breath and wheezing  Cardiovascular: Negative for chest pain  Gastrointestinal: Negative for abdominal pain, diarrhea, nausea and vomiting  Endocrine: Negative for cold intolerance  Genitourinary: Negative for dysuria and urgency  Musculoskeletal: Negative for arthralgias and back pain  Neurological: Negative for dizziness, weakness and headaches  Psychiatric/Behavioral: Negative for agitation and behavioral problems  Physical Exam  Physical Exam   Constitutional: He is oriented to person, place, and time  He appears well-developed and well-nourished  HENT:   Head: Normocephalic and atraumatic  Nose: Nose normal    Mouth/Throat: Oropharynx is clear and moist    Eyes: Conjunctivae and EOM are normal    Neck: Normal range of motion  Neck supple  Cardiovascular: Normal rate, regular rhythm and normal heart sounds  Pulmonary/Chest: Effort normal and breath sounds normal    Abdominal: Soft  Bowel sounds are normal  He exhibits no distension  There is no tenderness  Musculoskeletal: Normal range of motion  Neurological: He is alert and oriented to person, place, and time  Skin: Skin is warm  Psychiatric: He has a normal mood and affect  His behavior is normal  Judgment and thought content normal    Nursing note and vitals reviewed        Vital Signs  ED Triage Vitals [12/17/19 1330]   Temperature Pulse Respirations Blood Pressure SpO2   98 3 °F (36 8 °C) (!) 115 18 138/61 100 %      Temp Source Heart Rate Source Patient Position - Orthostatic VS BP Location FiO2 (%)   Tympanic Monitor Lying Left arm --      Pain Score       2           Vitals:    12/17/19 1515 12/17/19 1615 12/17/19 1700 12/17/19 1800   BP: 103/55 121/59 130/60 108/52   Pulse: (!) 114 (!) 114 (!) 115 (!) 117   Patient Position - Orthostatic VS: Lying Lying           Visual Acuity      ED Medications  Medications   insulin regular (HumuLIN R,NovoLIN R) 1 Units/mL in sodium chloride 0 9 % 100 mL infusion (1 Units/hr Intravenous Rate/Dose Change 12/17/19 1801)   lactated ringers infusion (125 mL/hr Intravenous New Bag 12/17/19 1717)   influenza vaccine, quadrivalent (FLUARIX) IM injection 0 5 mL (has no administration in time range)   sodium chloride 0 9 % bolus 1,000 mL (0 mL Intravenous Stopped 12/17/19 1455)   sodium chloride 0 9 % bolus 1,000 mL (0 mL Intravenous Stopped 12/17/19 1521)   insulin regular (HumuLIN R,NovoLIN R) injection 10 Units (10 Units Intravenous Given 12/17/19 1458)   acetaminophen (TYLENOL) tablet 650 mg (650 mg Oral Given 12/17/19 1456)       Diagnostic Studies  Results Reviewed     Procedure Component Value Units Date/Time    Lactic acid, plasma [[id number]]  (Normal) Collected:  12/17/19 1518    Lab Status:  Final result Specimen:  Blood from Arm, Right Updated:  12/17/19 1543     LACTIC ACID 1 7 mmol/L     Narrative:       Result may be elevated if tourniquet was used during collection  Fingerstick Glucose (POCT) [[id number]]  (Abnormal) Collected:  12/17/19 1515    Lab Status:  Final result Updated:  12/17/19 1517     POC Glucose 318 mg/dl     Rapid drug screen, urine [[id number]]  (Normal) Collected:  12/17/19 1437    Lab Status:  Final result Specimen:  Urine, Clean Catch Updated:  12/17/19 1500     Amph/Meth UR Negative     Barbiturate Ur Negative     Benzodiazepine Urine Negative     Cocaine Urine Negative     Methadone Urine Negative     Opiate Urine Negative     PCP Ur Negative     THC Urine Negative    Narrative:       FOR MEDICAL PURPOSES ONLY  IF CONFIRMATION NEEDED PLEASE CONTACT THE LAB WITHIN 5 DAYS      Drug Screen Cutoff Levels:  AMPHETAMINE/METHAMPHETAMINES  1000 ng/mL  BARBITURATES     200 ng/mL  BENZODIAZEPINES     200 ng/mL  COCAINE      300 ng/mL  METHADONE      300 ng/mL  OPIATES      300 ng/mL  PHENCYCLIDINE     25 ng/mL  THC       50 ng/mL      Urine Microscopic [[id number]]  (Normal) Collected:  12/17/19 1437    Lab Status:  Final result Specimen:  Urine, Clean Catch Updated:  12/17/19 1458     RBC, UA None Seen /hpf      WBC, UA None Seen /hpf      Epithelial Cells None Seen /hpf      Bacteria, UA None Seen /hpf     UA w Reflex to Microscopic w Reflex to Culture [[id number]]  (Abnormal) Collected:  12/17/19 1437    Lab Status:  Final result Specimen:  Urine, Clean Catch Updated:  12/17/19 1448     Color, UA Yellow     Clarity, UA Clear     Specific [place], UA 1 020     pH, UA 5 5     Leukocytes, UA Elevated glucose may cause decreased leukocyte values  See urine microscopic for [facility] result/     Nitrite, UA Negative     Protein, UA Negative mg/dl      Glucose, UA >=1000 (1%) mg/dl      Ketones, UA >=80 (3+) mg/dl      Urobilinogen, UA 0 2 E U /dl      Bilirubin, UA Negative     Blood, UA Negative    Blood gas, venous [[id number]]  (Abnormal) Collected:  12/17/19 1429    Lab Status:  Final result Specimen:  Blood from Arm, Left Updated:  12/17/19 1438     pH, Bill 7 277     pCO2, Bill 31 3 mm Hg      pO2, Bill 63 7 mm Hg      HCO3, Bill 14 3 mmol/L      Base Excess, Bill -11 1 mmol/L      O2 Content, Bill 19 6 ml/dL      O2 HGB, VENOUS 88 4 %     Lactic acid, plasma [[id number]]  (Abnormal) Collected:  12/17/19 1333    Lab Status:  Final result Specimen:  Blood Updated:  12/17/19 1431     LACTIC ACID 2 7 mmol/L     Narrative:       Result may be elevated if tourniquet was used during collection      Troponin I [[id number]]  (Normal) Collected:  12/17/19 1333    Lab Status:  Final result Specimen:  Blood Updated:  12/17/19 1421     Troponin I <0 02 ng/mL     Comprehensive metabolic panel [[id number]]  (Abnormal) Collected:  12/17/19 1333    Lab Status:  Final result Specimen:  Blood Updated:  12/17/19 1420     Sodium 132 mmol/L Potassium 5 1 mmol/L      Chloride 97 mmol/L      CO2 18 mmol/L      ANION GAP 17 mmol/L      BUN 16 mg/dL      Creatinine 1 03 mg/dL      Glucose 385 mg/dL      Calcium 9 1 mg/dL      AST 23 U/L      ALT 24 U/L      Alkaline Phosphatase 69 U/L      Total Protein 7 1 g/dL      Albumin 4 0 g/dL      Total Bilirubin 1 30 mg/dL      eGFR 98 ml/min/1 73sq m     Narrative:       National Kidney Disease Foundation guidelines for Chronic Kidney Disease (CKD):     Stage 1 with normal or high GFR (GFR > 90 mL/min/1 73 square meters)    Stage 2 Mild CKD (GFR = 60-89 mL/min/1 73 square meters)    Stage 3A Moderate CKD (GFR = 45-59 mL/min/1 73 square meters)    Stage 3B Moderate CKD (GFR = 30-44 mL/min/1 73 square meters)    Stage 4 Severe CKD (GFR = 15-29 mL/min/1 73 square meters)    Stage 5 End Stage CKD (GFR <15 mL/min/1 73 square meters)  Note: GFR calculation is accurate only with a steady state creatinine    Magnesium [[id number]]  (Normal) Collected:  12/17/19 1333    Lab Status:  Final result Specimen:  Blood Updated:  12/17/19 1420     Magnesium 1 7 mg/dL     Phosphorus [[id number]]  (Normal) Collected:  12/17/19 1333    Lab Status:  Final result Specimen:  Blood Updated:  12/17/19 1420     Phosphorus 2 7 mg/dL     D-Dimer [[id number]]  (Normal) Collected:  12/17/19 1333    Lab Status:  Final result Specimen:  Blood Updated:  12/17/19 1419     D-Dimer, Quant 0 19 ug/ml FEU     Beta Hydroxybutyrate [[id number]]  (Abnormal) Collected:  12/17/19 1333    Lab Status:  Final result Specimen:  Blood Updated:  12/17/19 1409     BETA-HYDROXYBUTYRATE 5 3 mmol/L     CBC and differential [[id number]]  (Abnormal) Collected:  12/17/19 1333    Lab Status:  Final result Specimen:  Blood Updated:  12/17/19 1358     WBC 19 17 Thousand/uL      RBC 4 71 Million/uL      Hemoglobin 15 6 g/dL      Hematocrit 48 0 %       fL      MCH 33 1 pg      MCHC 32 5 g/dL      RDW 12 4 %      MPV 9 1 fL      Platelets 720 Thousands/uL      nRBC 0 /100 WBCs      Neutrophils Relative 87 %      Immat GRANS % 1 %      Lymphocytes Relative 6 %      Monocytes Relative 5 %      Eosinophils Relative 0 %      Basophils Relative 1 %      Neutrophils Absolute 16 87 Thousands/µL      Immature Grans Absolute 0 12 Thousand/uL      Lymphocytes Absolute 1 10 Thousands/µL      Monocytes Absolute 0 93 Thousand/µL      Eosinophils Absolute 0 02 Thousand/µL      Basophils Absolute 0 13 Thousands/µL                  XR chest 2 views   Final Result by Shonna Du MD (12/17 9200)      No acute cardiopulmonary disease  Workstation performed: [id number]                    Procedures  ECG 12 Lead Documentation Only  Date/Time: 12/17/2019 1:29 PM  Performed by: Mai Garcia DO  Authorized by: Mai Garcia DO     Indications / Diagnosis:  Chest pain  ECG reviewed by me, the ED Provider: yes    Patient location:  ED  Previous ECG:     Previous ECG:  Compared to current    Similarity:  Changes noted    Comparison to cardiac monitor: Yes    Interpretation:     Interpretation: abnormal    Comments:      Sinus rhythm, rate 112, normal axis, normal intervals, no acute ST/T-wave abnormalities noted, sinus tachycardia that is new compared to previous study  Otherwise unremarkable EKG      CriticalCare Time  Performed by: Mai Garcia DO  Authorized by: Mai Garcia DO     Critical care provider statement:     Critical care time (minutes):  80    Critical care was necessary to treat or prevent imminent or life-threatening deterioration of the following conditions:  Endocrine crisis    Critical care was time spent personally by me on the following activities:  Blood draw for specimens, obtaining history from patient or surrogate, development of treatment plan with patient or surrogate, discussions with consultants, discussions with primary provider, evaluation of patient's response to treatment, examination of patient, ordering and review of radiographic studies, ordering and review of laboratory studies, ordering and performing treatments and interventions and interpretation of cardiac output measurements  Comments:      DKA             ED Course  ED Course as of Dec 17 1844   Tue Dec 17, 2019   1443 Case discussed with ICU attending, Dr Jesus Mock, who will admit pt to stepdown for DKA  I will place the order for insulin bolus in the ED  ICU team will place order for insulin drip  MDM  Number of Diagnoses or Management Options  Chest pain: new and requires workup  DKA (diabetic ketoacidoses) ([place] [address] ): new and requires workup  Diagnosis management comments: Obtain med DKA workup, chest x-ray  Give IV fluids and continue to monitor patient       Amount and/or Complexity of Data Reviewed  Clinical lab tests: ordered and reviewed  Tests in the radiology section of CPT®: ordered and reviewed  Tests in the medicine section of CPT®: ordered and reviewed  Review and summarize past medical records: yes  Independent visualization of images, tracings, or specimens: yes    Risk of Complications, Morbidity, and/or Mortality  General comments: Patient's lab derangements in ED are consistent with DKA  Although patient met SIRS criteria there is no source for infection  At this point IV fluids and insulin started in the ED and patient is admitted for further management  Patient's chest pain improved in the ED  Patient's EKG did not show any acute ST elevations  Patient's 1st troponin was negative  Patient is admitted for further management  Patient agrees with admission plans      Patient Progress  Patient progress: stable        Disposition  Final diagnoses:   DKA (diabetic ketoacidoses) ([place] [address] )   Chest pain     Time reflects when diagnosis was documented in both MDM as applicable and the Disposition within this note     Time User Action Codes Description Comment    12/17/2019  2:51 PM Iesha, [address] [place] [address] [E11 10] DKA (diabetic ketoacidoses) ([place] [address] ) 12/17/2019  2:51 PM Blake Sprain Add [R07 9] Chest pain       ED Disposition     ED Disposition Condition Date/Time Comment    Admit Stable Tue Dec 17, 2019  2:48 PM Case was discussed with Dr Juan James and the patient's admission status was agreed to be Admission Status: inpatient status to the service of Dr Juan James  Follow-up Information    None         Current Discharge Medication List      CONTINUE these medications which have NOT CHANGED    Details   insulin lispro (HUMALOG) 100 units/mL injection Inject under the skin           No discharge procedures on file      ED Provider  Electronically Signed by           Andria White DO  12/17/19 8349

## 2019-12-17 NOTE — ED NOTES
Patient aware awaiting ICU to be ready  Offers no complaints at this time  Visitors at bedside       Cyndee St RN  12/17/19 2280

## 2019-12-17 NOTE — ASSESSMENT & PLAN NOTE
Lab Results   Component Value Date    HGBA1C 8 2 (H) 08/29/2018       Recent Labs     12/17/19  1515   POCGLU 318*       Blood Sugar Average: Last 72 hrs:  (P) 318     · First occurrence for DKA, states he is otherwise well controlled w/ insulin pump  · Will transition to insulin drip, once gap closes can transition back to pump  · Diabetic diet  · LR at 125 cc/hr until repeat BMP  · BMP Q4H until anion gap closed  · Probable d/c home tomorrow

## 2019-12-17 NOTE — LETTER
[address]  [place] [address]  Dept: [phone]    December 18, 2019     Patient: Kae Landry   YOB: 1990   Date of Visit: 12/17/2019       To Whom it May Concern:    Ry Perez is under my professional care  He was seen in the hospital from 12/17/2019   to 12/18/19  He may return to work on 12/20/2019 without limitations  If you have any questions or concerns, please don't hesitate to call           Sincerely,          VITALY Velasquez

## 2019-12-18 VITALS
SYSTOLIC BLOOD PRESSURE: 102 MMHG | RESPIRATION RATE: 16 BRPM | WEIGHT: 160 LBS | TEMPERATURE: 99 F | BODY MASS INDEX: 25.71 KG/M2 | HEIGHT: 66 IN | DIASTOLIC BLOOD PRESSURE: 60 MMHG | OXYGEN SATURATION: 98 % | HEART RATE: 83 BPM

## 2019-12-18 PROBLEM — E10.10 TYPE 1 DIABETES MELLITUS WITH KETOACIDOSIS WITHOUT COMA (HCC): Status: RESOLVED | Noted: 2019-12-17 | Resolved: 2019-12-18

## 2019-12-18 LAB
ANION GAP SERPL CALCULATED.3IONS-SCNC: 7 MMOL/L (ref 4–13)
ANION GAP SERPL CALCULATED.3IONS-SCNC: 8 MMOL/L (ref 4–13)
BASOPHILS # BLD AUTO: 0.08 THOUSANDS/ΜL (ref 0–0.1)
BASOPHILS NFR BLD AUTO: 1 % (ref 0–1)
BUN SERPL-MCNC: 15 MG/DL (ref 5–25)
BUN SERPL-MCNC: 16 MG/DL (ref 5–25)
CALCIUM SERPL-MCNC: 7.9 MG/DL (ref 8.3–10.1)
CALCIUM SERPL-MCNC: 8 MG/DL (ref 8.3–10.1)
CHLORIDE SERPL-SCNC: 104 MMOL/L (ref 100–108)
CHLORIDE SERPL-SCNC: 104 MMOL/L (ref 100–108)
CO2 SERPL-SCNC: 22 MMOL/L (ref 21–32)
CO2 SERPL-SCNC: 23 MMOL/L (ref 21–32)
CREAT SERPL-MCNC: 0.79 MG/DL (ref 0.6–1.3)
CREAT SERPL-MCNC: 0.85 MG/DL (ref 0.6–1.3)
EOSINOPHIL # BLD AUTO: 0.13 THOUSAND/ΜL (ref 0–0.61)
EOSINOPHIL NFR BLD AUTO: 1 % (ref 0–6)
ERYTHROCYTE [DISTWIDTH] IN BLOOD BY AUTOMATED COUNT: 12.5 % (ref 11.6–15.1)
GFR SERPL CREATININE-BSD FRML MDRD: 118 ML/MIN/1.73SQ M
GFR SERPL CREATININE-BSD FRML MDRD: 121 ML/MIN/1.73SQ M
GLUCOSE SERPL-MCNC: 139 MG/DL (ref 65–140)
GLUCOSE SERPL-MCNC: 145 MG/DL (ref 65–140)
GLUCOSE SERPL-MCNC: 151 MG/DL (ref 65–140)
GLUCOSE SERPL-MCNC: 152 MG/DL (ref 65–140)
GLUCOSE SERPL-MCNC: 155 MG/DL (ref 65–140)
GLUCOSE SERPL-MCNC: 235 MG/DL (ref 65–140)
GLUCOSE SERPL-MCNC: 299 MG/DL (ref 65–140)
HCT VFR BLD AUTO: 39.8 % (ref 36.5–49.3)
HGB BLD-MCNC: 13.4 G/DL (ref 12–17)
IMM GRANULOCYTES # BLD AUTO: 0.04 THOUSAND/UL (ref 0–0.2)
IMM GRANULOCYTES NFR BLD AUTO: 0 % (ref 0–2)
LYMPHOCYTES # BLD AUTO: 2.73 THOUSANDS/ΜL (ref 0.6–4.47)
LYMPHOCYTES NFR BLD AUTO: 27 % (ref 14–44)
MAGNESIUM SERPL-MCNC: 1.8 MG/DL (ref 1.6–2.6)
MCH RBC QN AUTO: 33.7 PG (ref 26.8–34.3)
MCHC RBC AUTO-ENTMCNC: 33.7 G/DL (ref 31.4–37.4)
MCV RBC AUTO: 100 FL (ref 82–98)
MONOCYTES # BLD AUTO: 0.8 THOUSAND/ΜL (ref 0.17–1.22)
MONOCYTES NFR BLD AUTO: 8 % (ref 4–12)
NEUTROPHILS # BLD AUTO: 6.27 THOUSANDS/ΜL (ref 1.85–7.62)
NEUTS SEG NFR BLD AUTO: 63 % (ref 43–75)
NRBC BLD AUTO-RTO: 0 /100 WBCS
PHOSPHATE SERPL-MCNC: 2.6 MG/DL (ref 2.7–4.5)
PLATELET # BLD AUTO: 256 THOUSANDS/UL (ref 149–390)
PMV BLD AUTO: 8.8 FL (ref 8.9–12.7)
POTASSIUM SERPL-SCNC: 4 MMOL/L (ref 3.5–5.3)
POTASSIUM SERPL-SCNC: 4.3 MMOL/L (ref 3.5–5.3)
RBC # BLD AUTO: 3.98 MILLION/UL (ref 3.88–5.62)
SODIUM SERPL-SCNC: 134 MMOL/L (ref 136–145)
SODIUM SERPL-SCNC: 134 MMOL/L (ref 136–145)
WBC # BLD AUTO: 10.05 THOUSAND/UL (ref 4.31–10.16)

## 2019-12-18 PROCEDURE — 90471 IMMUNIZATION ADMIN: CPT | Performed by: NURSE PRACTITIONER

## 2019-12-18 PROCEDURE — 90686 IIV4 VACC NO PRSV 0.5 ML IM: CPT | Performed by: NURSE PRACTITIONER

## 2019-12-18 PROCEDURE — 80048 BASIC METABOLIC PNL TOTAL CA: CPT | Performed by: NURSE PRACTITIONER

## 2019-12-18 PROCEDURE — 82948 REAGENT STRIP/BLOOD GLUCOSE: CPT

## 2019-12-18 PROCEDURE — 83735 ASSAY OF MAGNESIUM: CPT | Performed by: NURSE PRACTITIONER

## 2019-12-18 PROCEDURE — 85025 COMPLETE CBC W/AUTO DIFF WBC: CPT | Performed by: NURSE PRACTITIONER

## 2019-12-18 PROCEDURE — 84100 ASSAY OF PHOSPHORUS: CPT | Performed by: NURSE PRACTITIONER

## 2019-12-18 RX ORDER — ACETAMINOPHEN 325 MG/1
650 TABLET ORAL EVERY 6 HOURS PRN
Status: DISCONTINUED | OUTPATIENT
Start: 2019-12-18 | End: 2019-12-18 | Stop reason: HOSPADM

## 2019-12-18 RX ADMIN — INFLUENZA VIRUS VACCINE 0.5 ML: 15; 15; 15; 15 SUSPENSION INTRAMUSCULAR at 08:18

## 2019-12-18 RX ADMIN — ACETAMINOPHEN 650 MG: 325 TABLET, FILM COATED ORAL at 02:13

## 2019-12-18 RX ADMIN — SODIUM CHLORIDE, SODIUM LACTATE, POTASSIUM CHLORIDE, AND CALCIUM CHLORIDE 125 ML/HR: .6; .31; .03; .02 INJECTION, SOLUTION INTRAVENOUS at 00:56

## 2019-12-18 NOTE — DISCHARGE INSTRUCTIONS
Diabetic Ketoacidosis   WHAT YOU NEED TO KNOW:   What is diabetic ketoacidosis? Diabetic ketoacidosis (DKA) is a life-threatening condition caused by dangerously high blood sugar levels  Your blood sugar levels become high because your body does not have enough insulin  Insulin helps move sugar out of the blood so it can be used for energy  The lack of insulin forces your body to use fat instead of sugar for energy  As fats are broken down, they leave chemicals called ketones that build up in your blood  Ketones are dangerous at high levels  What increases my risk for DKA? · Not enough insulin    · Poorly controlled diabetes    · Infection or other illness    · Heart attack, stroke, trauma, or surgery    · Certain medicines such as steroids or blood pressure medicines    · Illegal drugs such as cocaine    · Emotional stress    · Pregnancy  What are the signs and symptoms of DKA? · More thirst and more frequent urination than usual     · Abdominal pain, nausea, and vomiting     · Blurry vision     · Dry mouth, eyes, and skin, or your face is red and warm     · Fast, deep breathing, and a faster heartbeat than normal for you    · Weak, tired, and confused     · Fruity, sweet breath     · Mood changes and irritability  How is DKA treated? DKA can be life-threatening  You must get immediate medical attention  The goal of treatment is to replace lost body fluids, and to bring your blood sugar level back to normal    How can I help prevent DKA? The best way to prevent DKA is to control your diabetes  Ask your healthcare provider for more information on how to manage your diabetes  The following may help decrease your risk for DKA:  · Monitor your blood sugar levels closely  if you have an infection, are stressed, sick, or experience trauma  Check your blood sugar levels often  You may need to check at least 3 times each day   If your blood sugar level is too high, give yourself insulin as directed by your healthcare provider  · Manage your sick days  When you are sick, you may not eat as much as you normally would  You may need to change the amount of insulin you give yourself  You may need to check your blood sugar level more often than usual  Make a plan with your healthcare provider about how to manage your diabetes when you are sick  · Check your ketones as directed  Follow your healthcare provider's instructions about when you should check your blood or urine for ketones  Your healthcare provider may give you a machine to check your blood ketones  Urine ketones can be checked with sticks you dip in your urine  Do not exercise if you have ketones in your urine or blood  · Know how to treat DKA  If you have signs of DKA, drink more liquids that do not contain sugar, such as water  Take your insulin as directed by your healthcare provider and go to the nearest emergency room  Call 911 for any of the following:   · You have a seizure  · You begin to breathe fast, or are short of breath  · You become weak and confused  When should I seek immediate care? · You are more drowsy than usual      When should I contact my healthcare provider? · You have fruity, sweet breath  · You have severe, new stomach pain and are vomiting  · Your blood sugar level is lower or higher than your healthcare provider says it should be  · You have ketones in your blood or urine  · You have a fever or chills  · You are more thirsty than usual      · You are urinating more often than usual      · You have questions or concerns about your condition or care  CARE AGREEMENT:   You have the right to help plan your care  Learn about your health condition and how it may be treated  Discuss treatment options with your caregivers to decide what care you want to receive  You always have the right to refuse treatment  The above information is an  only   It is not intended as medical advice for individual conditions or treatments  Talk to your doctor, nurse or pharmacist before following any medical regimen to see if it is safe and effective for you  © 2017 [employer] Information is for End User's use only and may not be sold, redistributed or otherwise used for commercial purposes  All illustrations and images included in CareNotes® are the copyrighted property of [employer]  or Tremaine Cardenas

## 2019-12-18 NOTE — PLAN OF CARE
Problem: PAIN - ADULT  Goal: Verbalizes/displays adequate comfort level or baseline comfort level  Description  Interventions:  - Encourage patient to monitor pain and request assistance  - Assess pain using appropriate pain scale  - Administer analgesics based on type and severity of pain and evaluate response  - Implement non-pharmacological measures as appropriate and evaluate response  - Consider cultural and social influences on pain and pain management  - Notify physician/advanced practitioner if interventions unsuccessful or patient reports new pain  Outcome: Progressing     Problem: SAFETY ADULT  Goal: Patient will remain free of falls  Description  INTERVENTIONS:  - Assess patient frequently for physical needs  -  Identify cognitive and physical deficits and behaviors that affect risk of falls    -  Gate fall precautions as indicated by assessment   - Educate patient/family on patient safety including physical limitations  - Instruct patient to call for assistance with activity based on assessment  - Modify environment to reduce risk of injury  - Consider OT/PT consult to assist with strengthening/mobility  Outcome: Progressing     Problem: DISCHARGE PLANNING  Goal: Discharge to home or other facility with appropriate resources  Description  INTERVENTIONS:  - Identify barriers to discharge w/patient and caregiver  - Arrange for needed discharge resources and transportation as appropriate  - Identify discharge learning needs (meds, wound care, etc )  - Arrange for interpretive services to assist at discharge as needed  - Refer to Case Management Department for coordinating discharge planning if the patient needs post-hospital services based on physician/advanced practitioner order or complex needs related to functional status, cognitive ability, or social support system  Outcome: Progressing     Problem: Knowledge Deficit  Goal: Patient/family/caregiver demonstrates understanding of disease process, treatment plan, medications, and discharge instructions  Description  Complete learning assessment and assess knowledge base    Interventions:  - Provide teaching at level of understanding  - Provide teaching via preferred learning methods  Outcome: Progressing     Problem: METABOLIC, FLUID AND ELECTROLYTES - ADULT  Goal: Glucose maintained within target range  Description  INTERVENTIONS:  - Monitor Blood Glucose as ordered  - Assess for signs and symptoms of hyperglycemia and hypoglycemia  - Administer ordered medications to maintain glucose within target range  - Assess nutritional intake and initiate nutrition service referral as needed  Outcome: Progressing

## 2019-12-18 NOTE — DISCHARGE SUMMARY
Discharge Summary - [facility]    Patient Information: Sharon Woodard 34 y o  male MRN: [id number]  Unit/Bed#: ICU 06 Encounter: [id number]    Discharging Physician / Practitioner: VITALY Lakhani  PCP: Nikky Wen MD    Admission Date: 12/17/2019  Discharge Date: 12/18/19    Reason for Admission: DKA    Discharge Diagnoses:   Principal Problem (Resolved): Type 1 diabetes mellitus with ketoacidosis without coma (HCC)  Active Problems:    * No active hospital problems  *      Consultations During Hospital Stay:  · None    Procedures Performed:     · None    Significant Findings:     · None    Incidental Findings:   · None     Test Results Pending at Discharge (will require follow up): · None     Outpatient Tests Requested:  · None    Complications:  None    Hospital Course:     Sharon Woodard is a 34 y o  male patient who originally presented to the hospital on 12/17/2019 due to DKA  He was placed on insulin drip and given IVF  His blood sugar returned to normal and his anion gap closed  He is being transitioned back to his insulin pump and discharge home  Condition at Discharge: good     Discharge Day Visit / Exam:     * Please refer to separate progress for these details *    Discharge instructions/Information to patient and family:   See after visit summary for information provided to patient and family  Provisions for Follow-Up Care:  See after visit summary for information related to follow-up care and any pertinent home health orders  Disposition: Home    Planned Readmission: none    Discharge Statement:  I spent 15 minutes discharging the patient  This time was spent on the day of discharge  I had direct contact with the patient on the day of discharge  Greater than 50% of the total time was spent examining patient, answering all patient questions, arranging and discussing plan of care with patient as well as directly providing post-discharge instructions    Additional time then spent on discharge activities  Discharge Medications:  See after visit summary for reconciled discharge medications provided to patient and family      Discharge Diet: diabetic diet  Activity restrictions: none    VITALY Mederos  12/18/2019  8:44 AM

## 2019-12-18 NOTE — PROGRESS NOTES
Progress Note - Sharon Woodard 1990, 34 y o  male MRN: [id number]    Unit/Bed#: ICU 06 Encounter: [id number]    Primary Care Provider: Nikky Wen MD   Date and time admitted to hospital: 12/17/2019  1:25 PM      * Type 1 diabetes mellitus with ketoacidosis without coma [facility])  Assessment & Plan  Lab Results   Component Value Date    HGBA1C 8 2 (H) 08/29/2018       Recent Labs     12/17/19  2146 12/18/19  0005 12/18/19  0415 12/18/19  0809   POCGLU 319* 139 151* 145*       Blood Sugar Average: Last 72 hrs:  (P) 202  125     · First occurrence for DKA, states he is otherwise well controlled w/ insulin pump  · Will transition off insulin drip since gap closes and transition back to pump once his family brings in his supplies  · Diabetic diet  · LR can be d/c tolerating diet  · d/c home today      ----------------------------------------------------------------------------------------  HPI/24hr events: No issues overnight; BS controlled, awaiting his pump supplies to come from home    Disposition: Discharge to home   Code Status: Level 1 - Full Code  ---------------------------------------------------------------------------------------  SUBJECTIVE    Review of Systems   Constitutional: Negative  HENT: Negative  Eyes: Negative  Respiratory: Negative  Cardiovascular: Negative  Gastrointestinal: Negative  Endocrine: Negative  Genitourinary: Negative  Musculoskeletal: Negative  Skin: Negative  Neurological: Negative  Psychiatric/Behavioral: Negative  All other systems reviewed and are negative       ---------------------------------------------------------------------------------------  OBJECTIVE    Physical Exam   Constitutional: He is oriented to person, place, and time  He appears well-developed and well-nourished  No distress  HENT:   Head: Normocephalic and atraumatic  Eyes: Pupils are equal, round, and reactive to light  EOM are normal    Neck: Normal range of motion  Cardiovascular: Normal rate and regular rhythm  Pulmonary/Chest: Effort normal and breath sounds normal  No respiratory distress  Abdominal: Soft  Bowel sounds are normal  He exhibits no distension  There is no tenderness  Musculoskeletal: Normal range of motion  He exhibits no edema  Neurological: He is alert and oriented to person, place, and time  Skin: Skin is warm and dry  Capillary refill takes less than 2 seconds  He is not diaphoretic  Psychiatric: He has a normal mood and affect  His behavior is normal    Nursing note and vitals reviewed  Vitals   Vitals:    19 0200 19 0400 19 0600 19 0800   BP: 109/56 108/62 102/54    Pulse: 87 84 83    Resp: 20 21 18    Temp:   99 2 °F (37 3 °C) 99 °F (37 2 °C)   TempSrc:   Temporal Temporal   SpO2: 98% 97% 97%    Weight:       Height:         Temp (24hrs), Av °F (37 2 °C), Min:98 3 °F (36 8 °C), Max:99 6 °F (37 6 °C)  Current: Temperature: 99 °F (37 2 °C)          Invasive/non-invasive ventilation settings   Respiratory    Lab Data (Last 4 hours)    None         O2/Vent Data (Last 4 hours)    None                Height and Weights   Height: 5' 6" (167 6 cm)  IBW: 63 8 kg  Body mass index is 25 82 kg/m²  Weight (last 2 days)     Date/Time   Weight    19 1438   72 6 (160)                Intake and Output  I/O        07 -  0700  0701 -  0700  07 -  0700    P  O   448     I V  (mL/kg)  1490 5 (20 5)     IV Piggyback       Total Intake(mL/kg)  3938 5 (54 2)     Urine (mL/kg/hr)  1450     Total Output  1450     Net  +2488  5                  Nutrition       Diet Orders   (From admission, onward)             Start     Ordered    19 0816  Room Service  Once     Question:  Type of Service  Answer:  Room Service-Appropriate    19 9849    19 1657  Diet Andrea/CHO Controlled; Consistent Carbohydrate Diet Level 2 (5 carb servings/75 grams CHO/meal)  Diet effective now     Question Answer Comment   Diet Type Andrea/CHO Controlled    Andrea/CHO Controlled Consistent Carbohydrate Diet Level 2 (5 carb servings/75 grams CHO/meal)    RD to adjust diet per protocol? Yes        12/17/19 1656                Laboratory and Diagnostics:  Results from last 7 days   Lab Units 12/18/19  0552 12/17/19  1333   WBC Thousand/uL 10 05 19 17*   HEMOGLOBIN g/dL 13 4 15 6   HEMATOCRIT % 39 8 48 0   PLATELETS Thousands/uL 256 329   NEUTROS PCT % 63 87*   MONOS PCT % 8 5     Results from last 7 days   Lab Units 12/18/19  0552 12/18/19  0202 12/17/19  2145 12/17/19  1734 12/17/19  1333   SODIUM mmol/L 134* 134* 131* 133* 132*   POTASSIUM mmol/L 4 0 4 3 4 5 3 9 5 1   CHLORIDE mmol/L 104 104 102 103 97*   CO2 mmol/L 22 23 22 17* 18*   ANION GAP mmol/L 8 7 7 13 17*   BUN mg/dL 15 16 16 16 16   CREATININE mg/dL 0 79 0 85 1 04 0 95 1 03   CALCIUM mg/dL 7 9* 8 0* 7 8* 8 0* 9 1   GLUCOSE RANDOM mg/dL 155* 152* 289* 200* 385*   ALT U/L  --   --   --   --  24   AST U/L  --   --   --   --  23   ALK PHOS U/L  --   --   --   --  69   ALBUMIN g/dL  --   --   --   --  4 0   TOTAL BILIRUBIN mg/dL  --   --   --   --  1 30*     Results from last 7 days   Lab Units 12/18/19  0552 12/17/19  1333   MAGNESIUM mg/dL 1 8 1 7   PHOSPHORUS mg/dL 2 6* 2 7           Results from last 7 days   Lab Units 12/17/19  1333   TROPONIN I ng/mL <0 02     Results from last 7 days   Lab Units 12/17/19  1518 12/17/19  1333   LACTIC ACID mmol/L 1 7 2 7*     ABG:    VBG:  Results from last 7 days   Lab Units 12/17/19  1429   PH MANSI  7 277*   PCO2 MANSI mm Hg 31 3*   PO2 MANSI mm Hg 63 7*   HCO3 MANSI mmol/L 14 3*   BASE EXC MANSI mmol/L -11 1           Micro        EKG: NSR  Imaging: I have personally reviewed pertinent reports        Active Medications  Scheduled Meds:  Current Facility-Administered Medications:  acetaminophen 650 mg Oral Q6H PRN Cady Cazares PA-C    insulin regular (HumuLIN R,NovoLIN R) infusion 0 3-21 Units/hr Intravenous Titrated Lyndon Ma VITALY Cho Last Rate: 1 Units/hr (12/18/19 9816)     Continuous Infusions:    insulin regular (HumuLIN R,NovoLIN R) infusion 0 3-21 Units/hr Last Rate: 1 Units/hr (12/18/19 2215)     PRN Meds:     acetaminophen 650 mg Q6H PRN       ---------------------------------------------------------------------------------------  Advance Directive and Living Will:      Power of :    POLST:    ---------------------------------------------------------------------------------------      VITALY Mckeon

## 2019-12-18 NOTE — ASSESSMENT & PLAN NOTE
Lab Results   Component Value Date    HGBA1C 8 2 (H) 08/29/2018       Recent Labs     12/17/19  2146 12/18/19  0005 12/18/19  0415 12/18/19  0809   POCGLU 319* 139 151* 145*       Blood Sugar Average: Last 72 hrs:  (P) 202  125     · First occurrence for DKA, states he is otherwise well controlled w/ insulin pump  · Will transition off insulin drip since gap closes and transition back to pump once his family brings in his supplies  · Diabetic diet  · LR can be d/c tolerating diet  · d/c home today

## 2019-12-18 NOTE — UTILIZATION REVIEW
Initial Clinical Review    Admission: Date/Time/Statement: Inpatient Admission Orders (From admission, onward)     Ordered        12/17/19 1449  Inpatient Admission  Once                   Orders Placed This Encounter   Procedures    Inpatient Admission     Standing Status:   Standing     Number of Occurrences:   1     Order Specific Question:   Admitting Physician     Answer:   Cristian Donohue [[id number]]     Order Specific Question:   Level of Care     Answer:   Level 1 Stepdown [13]     Order Specific Question:   Estimated length of stay     Answer:   More than 2 Midnights     Order Specific Question:   Certification     Answer:   I certify that inpatient services are medically necessary for this patient for a duration of greater than two midnights  See H&P and MD Progress Notes for additional information about the patient's course of treatment  ED Arrival Information     Expected Arrival Acuity Means of Arrival Escorted By Service Admission Type    - 12/17/2019 13:23 Emergent Ambulance [facility] Emergency    Arrival Complaint    chest pain        Chief Complaint   Patient presents with    Chest Pain     patient c/o 45-50 minute onset of substernal chest pain (was riding in a car when pain started)  states the pain radiated down his left arm and into his jaw   + nausea  vomited x 1   states pain improved after vomiting  Assessment/Plan:  34 y o  male to ED with pmhx of type 1 DM who presents with chest pain that radiated to his left arm and jaw and generalized malaise and felt his BS was elevated  After work-up in the ED he was found to be in DKA  BS was only 385 but anion gap was 17, pH 7 27  Currently w/o CP or SOB, denies any other complaints and states he is normally well controlled on his insulin pump  Admit to SD for BS control   Admitted to ICU step down , continue insulin gtt , lr 125 bmp q4hr   ED Triage Vitals [12/17/19 1330]   Temperature Pulse Respirations Blood Pressure SpO2   98 3 °F (36 8 °C) (!) 115 18 138/61 100 %      Temp Source Heart Rate Source Patient Position - Orthostatic VS BP Location FiO2 (%)   Tympanic Monitor Lying Left arm --      Pain Score       2        Wt Readings from Last 1 Encounters:   12/17/19 72 6 kg (160 lb)     Additional Vital Signs:   12/17/19 2000  99 1 °F (37 3 °C)  114Abnormal   16  108/58  77  96 %  None (Room air)  Lying   12/17/19 1800    117Abnormal   22  108/52  74  97 %       12/17/19 1700    115Abnormal   20  130/60  86  97 %       12/17/19 1615  99 6 °F (37 6 °C)  114Abnormal   21  121/59    97 %  None (Room air)  Lying   12/17/19 1515  98 5 °F (36 9 °C)  114Abnormal   20  103/55    100 %  None (Room air)  Lying   12/17/19 1500    118Abnormal   20  126/56    100 %       12/17/19 1445    122Abnormal   25Abnormal       99 %       12/17/19 1438    122Abnormal   18  134/60    97 %           Pertinent Labs/Diagnostic Test Results:   cxr nad   Results from last 7 days   Lab Units 12/18/19  0552 12/17/19  1333   WBC Thousand/uL 10 05 19 17*   HEMOGLOBIN g/dL 13 4 15 6   HEMATOCRIT % 39 8 48 0   PLATELETS Thousands/uL 256 329   NEUTROS ABS Thousands/µL 6 27 16 87*     Results from last 7 days   Lab Units 12/18/19  0552 12/18/19  0202 12/17/19  2145 12/17/19  1734 12/17/19  1333   SODIUM mmol/L 134* 134* 131* 133* 132*   POTASSIUM mmol/L 4 0 4 3 4 5 3 9 5 1   CHLORIDE mmol/L 104 104 102 103 97*   CO2 mmol/L 22 23 22 17* 18*   ANION GAP mmol/L 8 7 7 13 17*   BUN mg/dL 15 16 16 16 16   CREATININE mg/dL 0 79 0 85 1 04 0 95 1 03   EGFR ml/min/1 73sq m 121 118 97 108 98   CALCIUM mg/dL 7 9* 8 0* 7 8* 8 0* 9 1   MAGNESIUM mg/dL 1 8  --   --   --  1 7   PHOSPHORUS mg/dL 2 6*  --   --   --  2 7     Results from last 7 days   Lab Units 12/17/19  1333   AST U/L 23   ALT U/L 24   ALK PHOS U/L 69   TOTAL PROTEIN g/dL 7 1   ALBUMIN g/dL 4 0   TOTAL BILIRUBIN mg/dL 1 30*     Results from last 7 days   Lab Units 12/18/19  0809 12/18/19  0415 12/18/19  0005 12/17/19  2146 12/17/19  2000 12/17/19  1757 12/17/19  1711 12/17/19  1515   POC GLUCOSE mg/dl 145* 151* 139 319* 171* 167* 207* 318*     Results from last 7 days   Lab Units 12/18/19  0552 12/18/19  0202 12/17/19  2145 12/17/19  1734 12/17/19  1333   GLUCOSE RANDOM mg/dL 155* 152* 289* 200* 385*     BETA-HYDROXYBUTYRATE   Date Value Ref Range Status   12/17/2019 5 3 (H) <0 6 mmol/L Final      Results from last 7 days   Lab Units 12/17/19  1429   PH MANSI  7 277*   PCO2 MANSI mm Hg 31 3*   PO2 MANSI mm Hg 63 7*   HCO3 MANSI mmol/L 14 3*   BASE EXC MANSI mmol/L -11 1   O2 CONTENT MANSI ml/dL 19 6   O2 HGB, VENOUS % 88 4*     Results from last 7 days   Lab Units 12/17/19  1333   TROPONIN I ng/mL <0 02     Results from last 7 days   Lab Units 12/17/19  1333   D-DIMER QUANTITATIVE ug/ml FEU 0 19     Results from last 7 days   Lab Units 12/17/19  1518 12/17/19  1333   LACTIC ACID mmol/L 1 7 2 7*     Results from last 7 days   Lab Units 12/17/19  1437   CLARITY UA  Clear   COLOR UA  Yellow   SPEC GRAV UA  1 020   PH UA  5 5   GLUCOSE UA mg/dl >=1000 (1%)*   KETONES UA mg/dl >=80 (3+)*   BLOOD UA  Negative   PROTEIN UA mg/dl Negative   NITRITE UA  Negative   BILIRUBIN UA  Negative   UROBILINOGEN UA E U /dl 0 2   LEUKOCYTES UA  Elevated glucose may cause decreased leukocyte values   See urine microscopic for [facility] result/*   WBC UA /hpf None Seen   RBC UA /hpf None Seen   BACTERIA UA /hpf None Seen   EPITHELIAL CELLS WET PREP /hpf None Seen     Results from last 7 days   Lab Units 12/17/19  1437   AMPH/METH  Negative   BARBITURATE UR  Negative   BENZODIAZEPINE UR  Negative   COCAINE UR  Negative   METHADONE URINE  Negative   OPIATE UR  Negative   PCP UR  Negative   THC UR  Negative     ED Treatment:   Medication Administration from 12/17/2019 1323 to 12/17/2019 1654       Date/Time Order Dose Route Action Action by Comments     12/17/2019 1455 sodium chloride 0 9 % bolus 1,000 mL 0 mL Intravenous Stopped Andria Silver, RN 12/17/2019 1429 sodium chloride 0 9 % bolus 1,000 mL 1,000 mL Intravenous New Bag Nikhil Joaquin [facility]      12/17/2019 1521 sodium chloride 0 9 % bolus 1,000 mL 0 mL Intravenous Stopped Damion Gao RN      12/17/2019 1455 sodium chloride 0 9 % bolus 1,000 mL 1,000 mL Intravenous New Bag Nikhil Joaquin Solis RN      12/17/2019 1458 insulin regular (HumuLIN R,NovoLIN R) injection 10 Units 10 Units Intravenous Given Ronal Mayers RN      12/17/2019 1456 acetaminophen (TYLENOL) tablet 650 mg 650 mg Oral Given Slime Johnson RN      12/17/2019 1519 insulin regular (HumuLIN R,NovoLIN R) 1 Units/mL in sodium chloride 0 9 % 100 mL infusion 6 Units/hr Intravenous New Bag Damion Gao RN Accucheck 318        Past Medical History:   Diagnosis Date    Diabetes mellitus ([facility] )      Present on Admission:   (Resolved) Type 1 diabetes mellitus with ketoacidosis without coma ([facility] )      Admitting Diagnosis: DKA (diabetic ketoacidoses) ([facility] ) [E11 10]  Chest pain [R07 9]  Age/Sex: 34 y o  male  Admission Orders:  ICU STEP [address] [facility]  Scheduled Medications:  IV INSULIN GTT   IV LACTATED RINGERS 125HR  IV NS BOLUS 1 L X2  IV INSULIN REGULAR 10U   TYLENOL X2  Continuous IV Infusions:     PRN Meds:    acetaminophen 650 mg Oral Q6H PRN       IP CONSULT TO CASE MANAGEMENT    Network Utilization Review Department  [email or web address]  ATTENTION: Please call with any questions or concerns to [phone] and carefully listen to the prompts so that you are directed to the right person  All voicemails are confidential   Alma Serra all requests for admission clinical reviews, approved or denied determinations and any other requests to dedicated fax number below belonging to the campus where the patient is receiving treatment   List of dedicated fax numbers for the Facilities:  FACILITY NAME UR FAX NUMBER   ADMISSION DENIALS (Administrative/Medical Necessity) [address] [facility] (Maternity/NICU/Pediatrics) [facility] [phone]   Yoselin Vargas [phone]   [facility] [phone]   [address] [phone]   [facility]  [phone]   [address] [facility], [facility]  [address] [phone]

## 2019-12-19 LAB
ATRIAL RATE: 112 BPM
MRSA NOSE QL CULT: NORMAL
P AXIS: 72 DEGREES
PR INTERVAL: 140 MS
QRS AXIS: 67 DEGREES
QRSD INTERVAL: 86 MS
QT INTERVAL: 346 MS
QTC INTERVAL: 472 MS
T WAVE AXIS: 63 DEGREES
VENTRICULAR RATE: 112 BPM

## 2019-12-19 PROCEDURE — 93010 ELECTROCARDIOGRAM REPORT: CPT | Performed by: INTERNAL MEDICINE

## 2019-12-24 NOTE — UTILIZATION REVIEW
Notification of Discharge  This is a Notification of Discharge from our facility [address]  Please be advised that this patient has been discharge from our facility  Below you will find the admission and discharge date and time including the patients disposition  PRESENTATION DATE: 12/17/2019  1:25 PM  OBS ADMISSION DATE:   IP ADMISSION DATE: 12/17/19 1449   DISCHARGE DATE: 12/18/2019 12:15 PM  DISPOSITION: Home/Self Care Home/Self Care   Admission Orders listed below:  Admission Orders (From admission, onward)     Ordered        12/17/19 1449  Inpatient Admission  Once                   Please contact the UR Department if additional information is required to close this patient's authorization/case  [facility] Utilization Review Department  Main: [phone] x carefully listen to the prompts  All voicemails are confidential   [email or web address]  Send all requests for admission clinical reviews, approved or denied determinations and any other requests to dedicated fax number below belonging to the campus where the patient is receiving treatment   List of dedicated fax numbers:  [address] DENIALS (Administrative/Medical Necessity) [phone]   [address] (Maternity/NICU/Pediatrics) [phone]   Olivia Wright [phone]   Yoselin Vargas [phone]   [facility] [phone]   [address] [phone]   [facility]  [phone]   [address] [facility], [address]  [address] [facility] [address] [facility] [phone]

## 2020-08-13 ENCOUNTER — APPOINTMENT (OUTPATIENT)
Dept: LAB | Facility: MEDICAL CENTER | Age: 30
End: 2020-08-13
Payer: COMMERCIAL

## 2020-08-13 ENCOUNTER — TRANSCRIBE ORDERS (OUTPATIENT)
Dept: ADMINISTRATIVE | Facility: HOSPITAL | Age: 30
End: 2020-08-13

## 2020-08-13 DIAGNOSIS — E11.9 TYPE 2 DIABETES MELLITUS WITHOUT COMPLICATION, UNSPECIFIED WHETHER LONG TERM INSULIN USE (HCC): ICD-10-CM

## 2020-08-13 DIAGNOSIS — E78.2 MIXED HYPERLIPIDEMIA: Primary | ICD-10-CM

## 2020-08-13 DIAGNOSIS — E78.2 MIXED HYPERLIPIDEMIA: ICD-10-CM

## 2020-08-13 LAB
ALBUMIN SERPL BCP-MCNC: 3.8 G/DL (ref 3.5–5)
ALP SERPL-CCNC: 68 U/L (ref 46–116)
ALT SERPL W P-5'-P-CCNC: 35 U/L (ref 12–78)
ANION GAP SERPL CALCULATED.3IONS-SCNC: 9 MMOL/L (ref 4–13)
AST SERPL W P-5'-P-CCNC: 36 U/L (ref 5–45)
BASOPHILS # BLD AUTO: 0.14 THOUSANDS/ΜL (ref 0–0.1)
BASOPHILS NFR BLD AUTO: 2 % (ref 0–1)
BILIRUB SERPL-MCNC: 0.4 MG/DL (ref 0.2–1)
BUN SERPL-MCNC: 8 MG/DL (ref 5–25)
CALCIUM SERPL-MCNC: 9.2 MG/DL (ref 8.3–10.1)
CHLORIDE SERPL-SCNC: 108 MMOL/L (ref 100–108)
CHOLEST SERPL-MCNC: 217 MG/DL (ref 50–200)
CO2 SERPL-SCNC: 25 MMOL/L (ref 21–32)
CREAT SERPL-MCNC: 0.82 MG/DL (ref 0.6–1.3)
CREAT UR-MCNC: 167 MG/DL
EOSINOPHIL # BLD AUTO: 0.27 THOUSAND/ΜL (ref 0–0.61)
EOSINOPHIL NFR BLD AUTO: 4 % (ref 0–6)
ERYTHROCYTE [DISTWIDTH] IN BLOOD BY AUTOMATED COUNT: 12.9 % (ref 11.6–15.1)
EST. AVERAGE GLUCOSE BLD GHB EST-MCNC: 206 MG/DL
GFR SERPL CREATININE-BSD FRML MDRD: 119 ML/MIN/1.73SQ M
GLUCOSE P FAST SERPL-MCNC: 210 MG/DL (ref 65–99)
HBA1C MFR BLD: 8.8 %
HCT VFR BLD AUTO: 47.9 % (ref 36.5–49.3)
HDLC SERPL-MCNC: 84 MG/DL
HGB BLD-MCNC: 16.2 G/DL (ref 12–17)
IMM GRANULOCYTES # BLD AUTO: 0.02 THOUSAND/UL (ref 0–0.2)
IMM GRANULOCYTES NFR BLD AUTO: 0 % (ref 0–2)
LDLC SERPL CALC-MCNC: 110 MG/DL (ref 0–100)
LYMPHOCYTES # BLD AUTO: 2.31 THOUSANDS/ΜL (ref 0.6–4.47)
LYMPHOCYTES NFR BLD AUTO: 33 % (ref 14–44)
MCH RBC QN AUTO: 34 PG (ref 26.8–34.3)
MCHC RBC AUTO-ENTMCNC: 33.8 G/DL (ref 31.4–37.4)
MCV RBC AUTO: 100 FL (ref 82–98)
MICROALBUMIN UR-MCNC: 15 MG/L (ref 0–20)
MICROALBUMIN/CREAT 24H UR: 9 MG/G CREATININE (ref 0–30)
MONOCYTES # BLD AUTO: 0.61 THOUSAND/ΜL (ref 0.17–1.22)
MONOCYTES NFR BLD AUTO: 9 % (ref 4–12)
NEUTROPHILS # BLD AUTO: 3.61 THOUSANDS/ΜL (ref 1.85–7.62)
NEUTS SEG NFR BLD AUTO: 52 % (ref 43–75)
NONHDLC SERPL-MCNC: 133 MG/DL
NRBC BLD AUTO-RTO: 0 /100 WBCS
PLATELET # BLD AUTO: 331 THOUSANDS/UL (ref 149–390)
PMV BLD AUTO: 9.2 FL (ref 8.9–12.7)
POTASSIUM SERPL-SCNC: 3.9 MMOL/L (ref 3.5–5.3)
PROT SERPL-MCNC: 7.5 G/DL (ref 6.4–8.2)
RBC # BLD AUTO: 4.77 MILLION/UL (ref 3.88–5.62)
SODIUM SERPL-SCNC: 142 MMOL/L (ref 136–145)
TRIGL SERPL-MCNC: 114 MG/DL
WBC # BLD AUTO: 6.96 THOUSAND/UL (ref 4.31–10.16)

## 2020-08-13 PROCEDURE — 80061 LIPID PANEL: CPT

## 2020-08-13 PROCEDURE — 83036 HEMOGLOBIN GLYCOSYLATED A1C: CPT

## 2020-08-13 PROCEDURE — 85025 COMPLETE CBC W/AUTO DIFF WBC: CPT

## 2020-08-13 PROCEDURE — 80053 COMPREHEN METABOLIC PANEL: CPT

## 2020-08-13 PROCEDURE — 36415 COLL VENOUS BLD VENIPUNCTURE: CPT

## 2020-08-13 PROCEDURE — 82570 ASSAY OF URINE CREATININE: CPT | Performed by: INTERNAL MEDICINE

## 2020-08-13 PROCEDURE — 82043 UR ALBUMIN QUANTITATIVE: CPT | Performed by: INTERNAL MEDICINE

## 2022-04-20 ENCOUNTER — APPOINTMENT (OUTPATIENT)
Dept: LAB | Facility: MEDICAL CENTER | Age: 32
End: 2022-04-20
Payer: COMMERCIAL

## 2022-04-20 DIAGNOSIS — E11.9 DIABETES MELLITUS WITHOUT COMPLICATION (HCC): ICD-10-CM

## 2022-04-20 DIAGNOSIS — I10 ESSENTIAL HYPERTENSION, MALIGNANT: ICD-10-CM

## 2022-04-20 LAB
BASOPHILS # BLD AUTO: 0.09 THOUSANDS/ΜL (ref 0–0.1)
BASOPHILS NFR BLD AUTO: 1 % (ref 0–1)
EOSINOPHIL # BLD AUTO: 0.32 THOUSAND/ΜL (ref 0–0.61)
EOSINOPHIL NFR BLD AUTO: 5 % (ref 0–6)
ERYTHROCYTE [DISTWIDTH] IN BLOOD BY AUTOMATED COUNT: 13 % (ref 11.6–15.1)
ERYTHROCYTE [SEDIMENTATION RATE] IN BLOOD: 9 MM/HOUR (ref 0–14)
EST. AVERAGE GLUCOSE BLD GHB EST-MCNC: 235 MG/DL
HBA1C MFR BLD: 9.8 %
HCT VFR BLD AUTO: 50.3 % (ref 36.5–49.3)
HGB BLD-MCNC: 17 G/DL (ref 12–17)
IMM GRANULOCYTES # BLD AUTO: 0.04 THOUSAND/UL (ref 0–0.2)
IMM GRANULOCYTES NFR BLD AUTO: 1 % (ref 0–2)
LYMPHOCYTES # BLD AUTO: 3.1 THOUSANDS/ΜL (ref 0.6–4.47)
LYMPHOCYTES NFR BLD AUTO: 50 % (ref 14–44)
MCH RBC QN AUTO: 33.3 PG (ref 26.8–34.3)
MCHC RBC AUTO-ENTMCNC: 33.8 G/DL (ref 31.4–37.4)
MCV RBC AUTO: 98 FL (ref 82–98)
MONOCYTES # BLD AUTO: 0.48 THOUSAND/ΜL (ref 0.17–1.22)
MONOCYTES NFR BLD AUTO: 8 % (ref 4–12)
NEUTROPHILS # BLD AUTO: 2.2 THOUSANDS/ΜL (ref 1.85–7.62)
NEUTS SEG NFR BLD AUTO: 35 % (ref 43–75)
NRBC BLD AUTO-RTO: 0 /100 WBCS
PLATELET # BLD AUTO: 356 THOUSANDS/UL (ref 149–390)
PMV BLD AUTO: 9 FL (ref 8.9–12.7)
RBC # BLD AUTO: 5.11 MILLION/UL (ref 3.88–5.62)
TSH SERPL DL<=0.05 MIU/L-ACNC: 1.47 UIU/ML (ref 0.45–4.5)
WBC # BLD AUTO: 6.23 THOUSAND/UL (ref 4.31–10.16)

## 2022-04-20 PROCEDURE — 83036 HEMOGLOBIN GLYCOSYLATED A1C: CPT

## 2022-04-20 PROCEDURE — 36415 COLL VENOUS BLD VENIPUNCTURE: CPT

## 2022-04-20 PROCEDURE — 85652 RBC SED RATE AUTOMATED: CPT

## 2022-04-20 PROCEDURE — 85025 COMPLETE CBC W/AUTO DIFF WBC: CPT

## 2022-04-20 PROCEDURE — 84443 ASSAY THYROID STIM HORMONE: CPT

## 2023-06-02 ENCOUNTER — APPOINTMENT (OUTPATIENT)
Dept: LAB | Facility: MEDICAL CENTER | Age: 33
End: 2023-06-02
Payer: COMMERCIAL

## 2023-06-02 DIAGNOSIS — I10 ESSENTIAL HYPERTENSION, BENIGN: ICD-10-CM

## 2023-06-02 DIAGNOSIS — E11.9 TYPE 2 DIABETES MELLITUS WITHOUT COMPLICATION, WITH LONG-TERM CURRENT USE OF INSULIN (HCC): ICD-10-CM

## 2023-06-02 DIAGNOSIS — Z79.4 TYPE 2 DIABETES MELLITUS WITHOUT COMPLICATION, WITH LONG-TERM CURRENT USE OF INSULIN (HCC): ICD-10-CM

## 2023-06-02 DIAGNOSIS — E78.2 MIXED HYPERLIPIDEMIA: ICD-10-CM

## 2023-06-02 LAB — TSH SERPL DL<=0.05 MIU/L-ACNC: 0.97 UIU/ML (ref 0.45–4.5)

## 2023-06-02 PROCEDURE — 36415 COLL VENOUS BLD VENIPUNCTURE: CPT

## 2023-06-02 PROCEDURE — 84443 ASSAY THYROID STIM HORMONE: CPT
